# Patient Record
Sex: MALE | Race: WHITE | Employment: OTHER | URBAN - METROPOLITAN AREA
[De-identification: names, ages, dates, MRNs, and addresses within clinical notes are randomized per-mention and may not be internally consistent; named-entity substitution may affect disease eponyms.]

---

## 2018-10-17 PROBLEM — C78.7 SECONDARY MALIGNANT NEOPLASM OF LIVER AND INTRAHEPATIC BILE DUCT (HCC): Status: ACTIVE | Noted: 2018-10-17

## 2018-10-17 PROBLEM — G62.0 POLYNEUROPATHY DUE TO DRUG (HCC): Status: ACTIVE | Noted: 2018-10-17

## 2018-10-17 PROBLEM — C15.5 MALIGNANT NEOPLASM OF LOWER THIRD OF ESOPHAGUS (HCC): Status: ACTIVE | Noted: 2018-10-17

## 2018-10-17 PROBLEM — I82.621 ACUTE EMBOLISM AND THROMBOSIS OF DEEP VEIN OF RIGHT UPPER EXTREMITY (HCC): Status: ACTIVE | Noted: 2018-10-17

## 2018-10-17 PROBLEM — C78.01 SECONDARY MALIGNANT NEOPLASM OF RIGHT LUNG (HCC): Status: ACTIVE | Noted: 2018-10-17

## 2018-10-28 PROBLEM — I87.8 POOR VENOUS ACCESS: Status: ACTIVE | Noted: 2018-10-28

## 2018-11-05 DIAGNOSIS — C78.7 SECONDARY MALIGNANT NEOPLASM OF LIVER AND INTRAHEPATIC BILE DUCT (HCC): ICD-10-CM

## 2018-11-05 DIAGNOSIS — C15.5 MALIGNANT NEOPLASM OF LOWER THIRD OF ESOPHAGUS (HCC): ICD-10-CM

## 2018-11-05 DIAGNOSIS — C78.01 SECONDARY MALIGNANT NEOPLASM OF RIGHT LUNG (HCC): ICD-10-CM

## 2018-11-05 LAB
ALBUMIN SERPL-MCNC: 3.9 G/DL (ref 3.9–4.9)
ALP BLD-CCNC: 140 U/L (ref 35–104)
ALT SERPL-CCNC: 12 U/L (ref 0–41)
ANION GAP SERPL CALCULATED.3IONS-SCNC: 11 MEQ/L (ref 7–13)
AST SERPL-CCNC: 19 U/L (ref 0–40)
BILIRUB SERPL-MCNC: 0.4 MG/DL (ref 0–1.2)
BUN BLDV-MCNC: 15 MG/DL (ref 8–23)
CALCIUM SERPL-MCNC: 9.5 MG/DL (ref 8.6–10.2)
CHLORIDE BLD-SCNC: 102 MEQ/L (ref 98–107)
CO2: 24 MEQ/L (ref 22–29)
CREAT SERPL-MCNC: 0.76 MG/DL (ref 0.7–1.2)
GFR AFRICAN AMERICAN: >60
GFR NON-AFRICAN AMERICAN: >60
GLOBULIN: 2.8 G/DL (ref 2.3–3.5)
GLUCOSE BLD-MCNC: 246 MG/DL (ref 74–109)
POTASSIUM SERPL-SCNC: 4.5 MEQ/L (ref 3.5–5.1)
SODIUM BLD-SCNC: 137 MEQ/L (ref 132–144)
TOTAL PROTEIN: 6.7 G/DL (ref 6.4–8.1)

## 2018-11-19 DIAGNOSIS — C15.5 MALIGNANT NEOPLASM OF LOWER THIRD OF ESOPHAGUS (HCC): ICD-10-CM

## 2018-11-19 PROBLEM — T45.1X5A LEUKOPENIA DUE TO ANTINEOPLASTIC CHEMOTHERAPY (HCC): Status: ACTIVE | Noted: 2018-11-19

## 2018-11-19 PROBLEM — D70.1 LEUKOPENIA DUE TO ANTINEOPLASTIC CHEMOTHERAPY (HCC): Status: ACTIVE | Noted: 2018-11-19

## 2018-11-19 LAB
ALBUMIN SERPL-MCNC: 3.8 G/DL (ref 3.9–4.9)
ALP BLD-CCNC: 153 U/L (ref 35–104)
ALT SERPL-CCNC: 11 U/L (ref 0–41)
ANION GAP SERPL CALCULATED.3IONS-SCNC: 13 MEQ/L (ref 7–13)
AST SERPL-CCNC: 18 U/L (ref 0–40)
BILIRUB SERPL-MCNC: 0.5 MG/DL (ref 0–1.2)
BUN BLDV-MCNC: 13 MG/DL (ref 8–23)
CALCIUM SERPL-MCNC: 9.9 MG/DL (ref 8.6–10.2)
CHLORIDE BLD-SCNC: 103 MEQ/L (ref 98–107)
CO2: 25 MEQ/L (ref 22–29)
CREAT SERPL-MCNC: 0.75 MG/DL (ref 0.7–1.2)
GFR AFRICAN AMERICAN: >60
GFR NON-AFRICAN AMERICAN: >60
GLOBULIN: 3.1 G/DL (ref 2.3–3.5)
GLUCOSE BLD-MCNC: 200 MG/DL (ref 74–109)
POTASSIUM SERPL-SCNC: 4.2 MEQ/L (ref 3.5–5.1)
SODIUM BLD-SCNC: 141 MEQ/L (ref 132–144)
TOTAL PROTEIN: 6.9 G/DL (ref 6.4–8.1)

## 2018-12-03 DIAGNOSIS — T45.1X5A ANEMIA DUE TO ANTINEOPLASTIC CHEMOTHERAPY: ICD-10-CM

## 2018-12-03 DIAGNOSIS — I87.8 POOR VENOUS ACCESS: ICD-10-CM

## 2018-12-03 DIAGNOSIS — D64.81 ANEMIA DUE TO ANTINEOPLASTIC CHEMOTHERAPY: ICD-10-CM

## 2018-12-03 DIAGNOSIS — C15.5 MALIGNANT NEOPLASM OF LOWER THIRD OF ESOPHAGUS (HCC): ICD-10-CM

## 2018-12-03 LAB
ALBUMIN SERPL-MCNC: 3.9 G/DL (ref 3.9–4.9)
ALP BLD-CCNC: 169 U/L (ref 35–104)
ALT SERPL-CCNC: 11 U/L (ref 0–41)
ANION GAP SERPL CALCULATED.3IONS-SCNC: 15 MEQ/L (ref 7–13)
AST SERPL-CCNC: 21 U/L (ref 0–40)
BILIRUB SERPL-MCNC: 0.4 MG/DL (ref 0–1.2)
BUN BLDV-MCNC: 7 MG/DL (ref 8–23)
CALCIUM SERPL-MCNC: 9.5 MG/DL (ref 8.6–10.2)
CHLORIDE BLD-SCNC: 101 MEQ/L (ref 98–107)
CO2: 24 MEQ/L (ref 22–29)
CREAT SERPL-MCNC: 0.7 MG/DL (ref 0.7–1.2)
FERRITIN: 306.4 NG/ML (ref 30–400)
GFR AFRICAN AMERICAN: >60
GFR NON-AFRICAN AMERICAN: >60
GLOBULIN: 2.8 G/DL (ref 2.3–3.5)
GLUCOSE BLD-MCNC: 185 MG/DL (ref 74–109)
IRON SATURATION: 25 % (ref 11–46)
IRON: 65 UG/DL (ref 59–158)
POTASSIUM SERPL-SCNC: 4.2 MEQ/L (ref 3.5–5.1)
SODIUM BLD-SCNC: 140 MEQ/L (ref 132–144)
TOTAL IRON BINDING CAPACITY: 261 UG/DL (ref 178–450)
TOTAL PROTEIN: 6.7 G/DL (ref 6.4–8.1)

## 2019-01-21 DIAGNOSIS — C15.5 MALIGNANT NEOPLASM OF LOWER THIRD OF ESOPHAGUS (HCC): ICD-10-CM

## 2019-01-21 LAB
ALBUMIN SERPL-MCNC: 4.2 G/DL (ref 3.9–4.9)
ALP BLD-CCNC: 83 U/L (ref 35–104)
ALT SERPL-CCNC: 14 U/L (ref 0–41)
ANION GAP SERPL CALCULATED.3IONS-SCNC: 13 MEQ/L (ref 7–13)
AST SERPL-CCNC: 19 U/L (ref 0–40)
BILIRUB SERPL-MCNC: 0.3 MG/DL (ref 0–1.2)
BUN BLDV-MCNC: 10 MG/DL (ref 8–23)
CALCIUM SERPL-MCNC: 9.7 MG/DL (ref 8.6–10.2)
CHLORIDE BLD-SCNC: 99 MEQ/L (ref 98–107)
CO2: 25 MEQ/L (ref 22–29)
CREAT SERPL-MCNC: 0.78 MG/DL (ref 0.7–1.2)
GFR AFRICAN AMERICAN: >60
GFR NON-AFRICAN AMERICAN: >60
GLOBULIN: 2.6 G/DL (ref 2.3–3.5)
GLUCOSE BLD-MCNC: 171 MG/DL (ref 74–109)
POTASSIUM SERPL-SCNC: 4.2 MEQ/L (ref 3.5–5.1)
SODIUM BLD-SCNC: 137 MEQ/L (ref 132–144)
TOTAL PROTEIN: 6.8 G/DL (ref 6.4–8.1)

## 2019-02-04 DIAGNOSIS — C78.7 SECONDARY MALIGNANT NEOPLASM OF LIVER AND INTRAHEPATIC BILE DUCT (HCC): ICD-10-CM

## 2019-02-04 DIAGNOSIS — C15.5 MALIGNANT NEOPLASM OF LOWER THIRD OF ESOPHAGUS (HCC): ICD-10-CM

## 2019-02-04 DIAGNOSIS — T45.1X5A ANEMIA DUE TO ANTINEOPLASTIC CHEMOTHERAPY: ICD-10-CM

## 2019-02-04 DIAGNOSIS — G62.0 POLYNEUROPATHY DUE TO DRUG (HCC): ICD-10-CM

## 2019-02-04 DIAGNOSIS — I87.8 POOR VENOUS ACCESS: ICD-10-CM

## 2019-02-04 DIAGNOSIS — D64.81 ANEMIA DUE TO ANTINEOPLASTIC CHEMOTHERAPY: ICD-10-CM

## 2019-02-04 LAB
ALBUMIN SERPL-MCNC: 4.2 G/DL (ref 3.9–4.9)
ALP BLD-CCNC: 78 U/L (ref 35–104)
ALT SERPL-CCNC: 13 U/L (ref 0–41)
ANION GAP SERPL CALCULATED.3IONS-SCNC: 12 MEQ/L (ref 7–13)
AST SERPL-CCNC: 17 U/L (ref 0–40)
BILIRUB SERPL-MCNC: 0.3 MG/DL (ref 0–1.2)
BUN BLDV-MCNC: 13 MG/DL (ref 8–23)
CALCIUM SERPL-MCNC: 9.8 MG/DL (ref 8.6–10.2)
CHLORIDE BLD-SCNC: 102 MEQ/L (ref 98–107)
CO2: 25 MEQ/L (ref 22–29)
CREAT SERPL-MCNC: 0.79 MG/DL (ref 0.7–1.2)
GFR AFRICAN AMERICAN: >60
GFR NON-AFRICAN AMERICAN: >60
GLOBULIN: 2.7 G/DL (ref 2.3–3.5)
GLUCOSE BLD-MCNC: 173 MG/DL (ref 74–109)
POTASSIUM SERPL-SCNC: 4.5 MEQ/L (ref 3.5–5.1)
SODIUM BLD-SCNC: 139 MEQ/L (ref 132–144)
TOTAL PROTEIN: 6.9 G/DL (ref 6.4–8.1)

## 2019-03-11 DIAGNOSIS — C15.5 MALIGNANT NEOPLASM OF LOWER THIRD OF ESOPHAGUS (HCC): ICD-10-CM

## 2019-03-11 DIAGNOSIS — T45.1X5A LEUKOPENIA DUE TO ANTINEOPLASTIC CHEMOTHERAPY (HCC): ICD-10-CM

## 2019-03-11 DIAGNOSIS — D70.1 LEUKOPENIA DUE TO ANTINEOPLASTIC CHEMOTHERAPY (HCC): ICD-10-CM

## 2019-03-11 LAB
ALBUMIN SERPL-MCNC: 4.2 G/DL (ref 3.5–4.6)
ALP BLD-CCNC: 77 U/L (ref 35–104)
ALT SERPL-CCNC: 14 U/L (ref 0–41)
ANION GAP SERPL CALCULATED.3IONS-SCNC: 10 MEQ/L (ref 9–15)
AST SERPL-CCNC: 20 U/L (ref 0–40)
BILIRUB SERPL-MCNC: 0.3 MG/DL (ref 0.2–0.7)
BUN BLDV-MCNC: 16 MG/DL (ref 8–23)
CALCIUM SERPL-MCNC: 9.6 MG/DL (ref 8.5–9.9)
CHLORIDE BLD-SCNC: 101 MEQ/L (ref 95–107)
CO2: 25 MEQ/L (ref 20–31)
CREAT SERPL-MCNC: 0.76 MG/DL (ref 0.7–1.2)
GFR AFRICAN AMERICAN: >60
GFR NON-AFRICAN AMERICAN: >60
GLOBULIN: 2.6 G/DL (ref 2.3–3.5)
GLUCOSE BLD-MCNC: 169 MG/DL (ref 70–99)
POTASSIUM SERPL-SCNC: 4.2 MEQ/L (ref 3.4–4.9)
SODIUM BLD-SCNC: 136 MEQ/L (ref 135–144)
TOTAL PROTEIN: 6.8 G/DL (ref 6.3–8)

## 2019-03-25 DIAGNOSIS — I87.8 POOR VENOUS ACCESS: ICD-10-CM

## 2019-03-25 DIAGNOSIS — C15.5 MALIGNANT NEOPLASM OF LOWER THIRD OF ESOPHAGUS (HCC): ICD-10-CM

## 2019-03-25 LAB
ALBUMIN SERPL-MCNC: 4.1 G/DL (ref 3.5–4.6)
ALP BLD-CCNC: 72 U/L (ref 35–104)
ALT SERPL-CCNC: 20 U/L (ref 0–41)
ANION GAP SERPL CALCULATED.3IONS-SCNC: 14 MEQ/L (ref 9–15)
AST SERPL-CCNC: 22 U/L (ref 0–40)
BILIRUB SERPL-MCNC: 0.3 MG/DL (ref 0.2–0.7)
BUN BLDV-MCNC: 14 MG/DL (ref 8–23)
CALCIUM SERPL-MCNC: 9.4 MG/DL (ref 8.5–9.9)
CHLORIDE BLD-SCNC: 101 MEQ/L (ref 95–107)
CO2: 24 MEQ/L (ref 20–31)
CREAT SERPL-MCNC: 0.8 MG/DL (ref 0.7–1.2)
GFR AFRICAN AMERICAN: >60
GFR NON-AFRICAN AMERICAN: >60
GLOBULIN: 2.6 G/DL (ref 2.3–3.5)
GLUCOSE BLD-MCNC: 201 MG/DL (ref 70–99)
POTASSIUM SERPL-SCNC: 4.6 MEQ/L (ref 3.4–4.9)
SODIUM BLD-SCNC: 139 MEQ/L (ref 135–144)
TOTAL PROTEIN: 6.7 G/DL (ref 6.3–8)

## 2019-04-22 DIAGNOSIS — D70.1 LEUKOPENIA DUE TO ANTINEOPLASTIC CHEMOTHERAPY (HCC): ICD-10-CM

## 2019-04-22 DIAGNOSIS — T45.1X5A LEUKOPENIA DUE TO ANTINEOPLASTIC CHEMOTHERAPY (HCC): ICD-10-CM

## 2019-04-22 DIAGNOSIS — C15.5 MALIGNANT NEOPLASM OF LOWER THIRD OF ESOPHAGUS (HCC): ICD-10-CM

## 2019-04-22 LAB
ALBUMIN SERPL-MCNC: 4 G/DL (ref 3.5–4.6)
ALP BLD-CCNC: 70 U/L (ref 35–104)
ALT SERPL-CCNC: 15 U/L (ref 0–41)
ANION GAP SERPL CALCULATED.3IONS-SCNC: 16 MEQ/L (ref 9–15)
AST SERPL-CCNC: 19 U/L (ref 0–40)
BILIRUB SERPL-MCNC: 0.5 MG/DL (ref 0.2–0.7)
BUN BLDV-MCNC: 10 MG/DL (ref 8–23)
CALCIUM SERPL-MCNC: 9.4 MG/DL (ref 8.5–9.9)
CHLORIDE BLD-SCNC: 100 MEQ/L (ref 95–107)
CO2: 23 MEQ/L (ref 20–31)
CREAT SERPL-MCNC: 0.8 MG/DL (ref 0.7–1.2)
GFR AFRICAN AMERICAN: >60
GFR NON-AFRICAN AMERICAN: >60
GLOBULIN: 2.8 G/DL (ref 2.3–3.5)
GLUCOSE BLD-MCNC: 199 MG/DL (ref 70–99)
POTASSIUM SERPL-SCNC: 4.5 MEQ/L (ref 3.4–4.9)
SODIUM BLD-SCNC: 139 MEQ/L (ref 135–144)
TOTAL PROTEIN: 6.8 G/DL (ref 6.3–8)

## 2019-05-06 DIAGNOSIS — C15.5 MALIGNANT NEOPLASM OF LOWER THIRD OF ESOPHAGUS (HCC): ICD-10-CM

## 2019-05-06 DIAGNOSIS — I87.8 POOR VENOUS ACCESS: ICD-10-CM

## 2019-05-06 LAB
ALBUMIN SERPL-MCNC: 4 G/DL (ref 3.5–4.6)
ALP BLD-CCNC: 63 U/L (ref 35–104)
ALT SERPL-CCNC: 14 U/L (ref 0–41)
ANION GAP SERPL CALCULATED.3IONS-SCNC: 16 MEQ/L (ref 9–15)
AST SERPL-CCNC: 21 U/L (ref 0–40)
BILIRUB SERPL-MCNC: 0.5 MG/DL (ref 0.2–0.7)
BUN BLDV-MCNC: 16 MG/DL (ref 8–23)
CALCIUM SERPL-MCNC: 9.3 MG/DL (ref 8.5–9.9)
CHLORIDE BLD-SCNC: 100 MEQ/L (ref 95–107)
CO2: 23 MEQ/L (ref 20–31)
CREAT SERPL-MCNC: 0.83 MG/DL (ref 0.7–1.2)
GFR AFRICAN AMERICAN: >60
GFR NON-AFRICAN AMERICAN: >60
GLOBULIN: 2.7 G/DL (ref 2.3–3.5)
GLUCOSE BLD-MCNC: 137 MG/DL (ref 70–99)
POTASSIUM SERPL-SCNC: 4.3 MEQ/L (ref 3.4–4.9)
SODIUM BLD-SCNC: 139 MEQ/L (ref 135–144)
TOTAL PROTEIN: 6.7 G/DL (ref 6.3–8)

## 2019-05-20 PROBLEM — D61.810 PANCYTOPENIA DUE TO ANTINEOPLASTIC CHEMOTHERAPY (HCC): Status: ACTIVE | Noted: 2019-05-20

## 2019-05-20 PROBLEM — T45.1X5A PANCYTOPENIA DUE TO ANTINEOPLASTIC CHEMOTHERAPY (HCC): Status: ACTIVE | Noted: 2019-05-20

## 2019-07-02 PROBLEM — C79.31 BRAIN METASTASIS (HCC): Status: ACTIVE | Noted: 2019-07-02

## 2019-07-02 PROBLEM — G93.6 CEREBRAL EDEMA (HCC): Status: ACTIVE | Noted: 2019-07-02

## 2019-07-22 ENCOUNTER — HOSPITAL ENCOUNTER (OUTPATIENT)
Dept: RADIATION ONCOLOGY | Age: 74
Discharge: HOME | End: 2019-07-22
Payer: MEDICARE

## 2019-07-22 VITALS
RESPIRATION RATE: 16 BRPM | OXYGEN SATURATION: 98 % | DIASTOLIC BLOOD PRESSURE: 79 MMHG | HEART RATE: 60 BPM | WEIGHT: 162 LBS | SYSTOLIC BLOOD PRESSURE: 138 MMHG | BODY MASS INDEX: 24.63 KG/M2 | TEMPERATURE: 97.4 F

## 2019-07-22 DIAGNOSIS — C78.7 SECONDARY MALIGNANT NEOPLASM OF LIVER AND INTRAHEPATIC BILE DUCT (HCC): ICD-10-CM

## 2019-07-22 DIAGNOSIS — C15.5 MALIGNANT NEOPLASM OF LOWER THIRD OF ESOPHAGUS (HCC): ICD-10-CM

## 2019-07-22 DIAGNOSIS — C79.31 BRAIN METASTASIS (HCC): ICD-10-CM

## 2019-07-22 DIAGNOSIS — C78.01 SECONDARY MALIGNANT NEOPLASM OF RIGHT LUNG (HCC): Primary | ICD-10-CM

## 2019-07-22 PROCEDURE — 99212 OFFICE O/P EST SF 10 MIN: CPT | Performed by: RADIOLOGY

## 2019-07-22 RX ORDER — INSULIN GLARGINE 100 [IU]/ML
24 INJECTION, SOLUTION SUBCUTANEOUS EVERY MORNING
COMMUNITY

## 2019-08-09 ENCOUNTER — HOSPITAL ENCOUNTER (OUTPATIENT)
Dept: RADIATION ONCOLOGY | Age: 74
Discharge: HOME | End: 2019-08-09
Payer: MEDICARE

## 2019-08-09 VITALS
DIASTOLIC BLOOD PRESSURE: 63 MMHG | HEART RATE: 71 BPM | RESPIRATION RATE: 16 BRPM | SYSTOLIC BLOOD PRESSURE: 105 MMHG | TEMPERATURE: 97.3 F | WEIGHT: 155.8 LBS | BODY MASS INDEX: 23.69 KG/M2 | OXYGEN SATURATION: 98 %

## 2019-08-09 DIAGNOSIS — C15.5 MALIGNANT NEOPLASM OF LOWER THIRD OF ESOPHAGUS (HCC): ICD-10-CM

## 2019-08-09 DIAGNOSIS — C78.01 SECONDARY MALIGNANT NEOPLASM OF RIGHT LUNG (HCC): Primary | ICD-10-CM

## 2019-08-09 DIAGNOSIS — C79.31 BRAIN METASTASIS (HCC): ICD-10-CM

## 2019-08-09 PROCEDURE — 77334 RADIATION TREATMENT AID(S): CPT | Performed by: RADIOLOGY

## 2019-08-09 PROCEDURE — 99214 OFFICE O/P EST MOD 30 MIN: CPT | Performed by: RADIOLOGY

## 2019-08-09 PROCEDURE — 77290 THER RAD SIMULAJ FIELD CPLX: CPT | Performed by: RADIOLOGY

## 2019-08-09 NOTE — ONCOLOGY
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 Last chemotherapy dose was on 7/1/19. Systemic therapy was interrupted by the finding of CNS metastases, with a 4.2 cm right parieto-occipital mass with some extension into the temporal lobe and surrounding edema, and there is a second roughly 0.5 cm lesion involving the left parietal lobe. PRIOR TREATMENT: Craniotomy at [facility] with Dr. Teresa Covarrubias on 7/23/2019, gross total resection based on postop imaging, pathology reported adenocarcinoma consistent with GI origin. TIME SINCE TREATMENT: 2.5 weeks and surgery    INTERVAL HISTORY: The patient had presented with a left visual field deficit. This has improved since surgery. No other neurological deficits. No headaches, nausea vomiting, or visual symptoms. Patient is ambulatory without difficulty. Our plan is to proceed with SBRT to the surgical bed, and Gamma Knife the second lesion on 8/26/2019. The patient came in today for CT simulation in preparation for SBRT.     PAST MEDICAL HISTORY:   Past Medical History:   Diagnosis

## 2019-08-15 ENCOUNTER — HOSPITAL ENCOUNTER (OUTPATIENT)
Dept: MRI IMAGING | Age: 74
Discharge: HOME | End: 2019-08-17
Payer: MEDICARE

## 2019-08-15 DIAGNOSIS — C79.31 SECONDARY MALIGNANT NEOPLASM OF BRAIN AND SPINAL CORD (HCC): ICD-10-CM

## 2019-08-15 DIAGNOSIS — C79.49 SECONDARY MALIGNANT NEOPLASM OF BRAIN AND SPINAL CORD (HCC): ICD-10-CM

## 2019-08-15 PROCEDURE — A9577 INJ MULTIHANCE: HCPCS | Performed by: RADIOLOGY

## 2019-08-15 PROCEDURE — 6360000004 HC RX CONTRAST MEDICATION: Performed by: RADIOLOGY

## 2019-08-15 PROCEDURE — 70553 MRI BRAIN STEM W/O & W/DYE: CPT

## 2019-08-15 RX ORDER — SODIUM CHLORIDE 0.9 % (FLUSH) 0.9 %
10 SYRINGE (ML) INJECTION 2 TIMES DAILY
Status: DISCONTINUED | OUTPATIENT
Start: 2019-08-15 | End: 2019-08-18 | Stop reason: HOSPADM

## 2019-08-15 RX ADMIN — GADOBENATE DIMEGLUMINE 15 ML: 529 INJECTION, SOLUTION INTRAVENOUS at 20:41

## 2019-08-16 PROCEDURE — 77295 3-D RADIOTHERAPY PLAN: CPT | Performed by: RADIOLOGY

## 2019-08-16 PROCEDURE — 77399 UNLISTED PX MED RADJ PHYSICS: CPT | Performed by: RADIOLOGY

## 2019-08-16 PROCEDURE — 77300 RADIATION THERAPY DOSE PLAN: CPT | Performed by: RADIOLOGY

## 2019-08-16 PROCEDURE — 77334 RADIATION TREATMENT AID(S): CPT | Performed by: RADIOLOGY

## 2019-08-19 PROCEDURE — 77470 SPECIAL RADIATION TREATMENT: CPT | Performed by: RADIOLOGY

## 2019-08-19 PROCEDURE — 77370 RADIATION PHYSICS CONSULT: CPT | Performed by: RADIOLOGY

## 2019-08-21 PROCEDURE — 77280 THER RAD SIMULAJ FIELD SMPL: CPT | Performed by: RADIOLOGY

## 2019-08-21 PROCEDURE — 77373 STRTCTC BDY RAD THER TX DLVR: CPT | Performed by: RADIOLOGY

## 2019-08-21 PROCEDURE — 99211 OFF/OP EST MAY X REQ PHY/QHP: CPT | Performed by: RADIOLOGY

## 2019-08-21 RX ORDER — DEXAMETHASONE 2 MG/1
2 TABLET ORAL DAILY
COMMUNITY
End: 2019-10-10 | Stop reason: ALTCHOICE

## 2019-08-22 PROCEDURE — 99211 OFF/OP EST MAY X REQ PHY/QHP: CPT | Performed by: RADIOLOGY

## 2019-08-22 PROCEDURE — 77373 STRTCTC BDY RAD THER TX DLVR: CPT | Performed by: RADIOLOGY

## 2019-08-22 PROCEDURE — 77417 THER RADIOLOGY PORT IMAGE(S): CPT | Performed by: RADIOLOGY

## 2019-08-23 PROCEDURE — 77417 THER RADIOLOGY PORT IMAGE(S): CPT | Performed by: RADIOLOGY

## 2019-08-23 PROCEDURE — 99211 OFF/OP EST MAY X REQ PHY/QHP: CPT | Performed by: RADIOLOGY

## 2019-08-23 PROCEDURE — 77373 STRTCTC BDY RAD THER TX DLVR: CPT | Performed by: RADIOLOGY

## 2019-09-05 ENCOUNTER — HOSPITAL ENCOUNTER (OUTPATIENT)
Dept: CT IMAGING | Age: 74
Discharge: HOME | End: 2019-09-07
Payer: MEDICARE

## 2019-09-05 ENCOUNTER — HOSPITAL ENCOUNTER (OUTPATIENT)
Dept: CT IMAGING | Age: 74
End: 2019-09-05
Payer: MEDICARE

## 2019-09-05 DIAGNOSIS — C78.7 SECONDARY MALIGNANT NEOPLASM OF LIVER (HCC): ICD-10-CM

## 2019-09-05 DIAGNOSIS — C78.01 SECONDARY MALIGNANT NEOPLASM OF RIGHT LUNG (HCC): ICD-10-CM

## 2019-09-05 DIAGNOSIS — C15.5 ADENOCARCINOMA OF LOWER ESOPHAGUS (HCC): ICD-10-CM

## 2019-09-05 DIAGNOSIS — C79.31 BRAIN METASTASIS (HCC): ICD-10-CM

## 2019-09-05 DIAGNOSIS — T45.1X5A ANEMIA DUE TO CHEMOTHERAPY: ICD-10-CM

## 2019-09-05 DIAGNOSIS — D64.81 ANEMIA DUE TO CHEMOTHERAPY: ICD-10-CM

## 2019-09-05 PROCEDURE — 2580000003 HC RX 258: Performed by: INTERNAL MEDICINE

## 2019-09-05 PROCEDURE — 74177 CT ABD & PELVIS W/CONTRAST: CPT

## 2019-09-05 PROCEDURE — 2500000003 HC RX 250 WO HCPCS: Performed by: INTERNAL MEDICINE

## 2019-09-05 PROCEDURE — 71260 CT THORAX DX C+: CPT

## 2019-09-05 PROCEDURE — 6360000004 HC RX CONTRAST MEDICATION: Performed by: INTERNAL MEDICINE

## 2019-09-05 RX ORDER — SODIUM CHLORIDE 0.9 % (FLUSH) 0.9 %
10 SYRINGE (ML) INJECTION ONCE
Status: COMPLETED | OUTPATIENT
Start: 2019-09-05 | End: 2019-09-05

## 2019-09-05 RX ADMIN — IOPAMIDOL 100 ML: 612 INJECTION, SOLUTION INTRAVENOUS at 09:00

## 2019-09-05 RX ADMIN — BARIUM SULFATE 450 ML: 20 SUSPENSION ORAL at 08:59

## 2019-09-05 RX ADMIN — Medication 10 ML: at 09:00

## 2019-10-10 ENCOUNTER — HOSPITAL ENCOUNTER (OUTPATIENT)
Dept: RADIATION ONCOLOGY | Age: 74
Discharge: HOME | End: 2019-10-10
Payer: MEDICARE

## 2019-10-10 VITALS
HEART RATE: 76 BPM | SYSTOLIC BLOOD PRESSURE: 137 MMHG | TEMPERATURE: 97.5 F | RESPIRATION RATE: 16 BRPM | BODY MASS INDEX: 25.33 KG/M2 | WEIGHT: 166.6 LBS | OXYGEN SATURATION: 95 % | DIASTOLIC BLOOD PRESSURE: 77 MMHG

## 2019-10-10 DIAGNOSIS — C78.01 SECONDARY MALIGNANT NEOPLASM OF RIGHT LUNG (HCC): Primary | ICD-10-CM

## 2019-10-10 DIAGNOSIS — C78.7 SECONDARY MALIGNANT NEOPLASM OF LIVER AND INTRAHEPATIC BILE DUCT (HCC): ICD-10-CM

## 2019-10-10 DIAGNOSIS — C15.5 MALIGNANT NEOPLASM OF LOWER THIRD OF ESOPHAGUS (HCC): ICD-10-CM

## 2019-10-10 PROCEDURE — 99212 OFFICE O/P EST SF 10 MIN: CPT | Performed by: RADIOLOGY

## 2019-11-04 LAB — VITAMIN D 25-HYDROXY: 31 NG/ML

## 2019-11-07 ENCOUNTER — HOSPITAL ENCOUNTER (OUTPATIENT)
Dept: RADIATION ONCOLOGY | Age: 74
Discharge: HOME | End: 2019-11-07
Payer: MEDICARE

## 2019-11-07 ENCOUNTER — APPOINTMENT (OUTPATIENT)
Dept: RADIATION ONCOLOGY | Age: 74
End: 2019-11-07
Payer: MEDICARE

## 2019-11-07 ENCOUNTER — HOSPITAL ENCOUNTER (OUTPATIENT)
Dept: RADIATION ONCOLOGY | Age: 74
Discharge: HOME | End: 2019-11-07
Attending: RADIOLOGY
Payer: MEDICARE

## 2019-11-07 VITALS
BODY MASS INDEX: 24.14 KG/M2 | HEART RATE: 92 BPM | TEMPERATURE: 99.1 F | DIASTOLIC BLOOD PRESSURE: 93 MMHG | OXYGEN SATURATION: 98 % | WEIGHT: 158.73 LBS | SYSTOLIC BLOOD PRESSURE: 143 MMHG | RESPIRATION RATE: 16 BRPM

## 2019-11-07 DIAGNOSIS — C79.31 BRAIN METASTASIS (HCC): ICD-10-CM

## 2019-11-07 DIAGNOSIS — C15.5 MALIGNANT NEOPLASM OF LOWER THIRD OF ESOPHAGUS (HCC): Primary | ICD-10-CM

## 2019-11-07 DIAGNOSIS — C78.01 SECONDARY MALIGNANT NEOPLASM OF RIGHT LUNG (HCC): ICD-10-CM

## 2019-11-07 DIAGNOSIS — G89.3 PAIN FROM BONE METASTASES (HCC): ICD-10-CM

## 2019-11-07 DIAGNOSIS — G89.3 PAIN FROM BONE METASTASES (HCC): Primary | ICD-10-CM

## 2019-11-07 DIAGNOSIS — C78.7 SECONDARY MALIGNANT NEOPLASM OF LIVER AND INTRAHEPATIC BILE DUCT (HCC): ICD-10-CM

## 2019-11-07 DIAGNOSIS — C79.51 PAIN FROM BONE METASTASES (HCC): Primary | ICD-10-CM

## 2019-11-07 DIAGNOSIS — C79.51 PAIN FROM BONE METASTASES (HCC): ICD-10-CM

## 2019-11-07 DIAGNOSIS — C15.5 MALIGNANT NEOPLASM OF LOWER THIRD OF ESOPHAGUS (HCC): ICD-10-CM

## 2019-11-07 PROCEDURE — 99214 OFFICE O/P EST MOD 30 MIN: CPT | Performed by: RADIOLOGY

## 2019-11-07 PROCEDURE — 77334 RADIATION TREATMENT AID(S): CPT | Performed by: RADIOLOGY

## 2019-11-07 PROCEDURE — 77290 THER RAD SIMULAJ FIELD CPLX: CPT | Performed by: RADIOLOGY

## 2019-11-07 RX ORDER — LEVETIRACETAM 250 MG/1
250 TABLET ORAL 3 TIMES DAILY
COMMUNITY

## 2019-11-07 RX ORDER — OXYCODONE HYDROCHLORIDE AND ACETAMINOPHEN 5; 325 MG/1; MG/1
1 TABLET ORAL EVERY 4 HOURS PRN
Qty: 42 TABLET | Refills: 0 | Status: SHIPPED | OUTPATIENT
Start: 2019-11-07 | End: 2019-11-14

## 2019-11-08 PROCEDURE — 77300 RADIATION THERAPY DOSE PLAN: CPT | Performed by: RADIOLOGY

## 2019-11-08 PROCEDURE — 77295 3-D RADIOTHERAPY PLAN: CPT | Performed by: RADIOLOGY

## 2019-11-08 PROCEDURE — 77334 RADIATION TREATMENT AID(S): CPT | Performed by: RADIOLOGY

## 2019-11-11 PROCEDURE — 77412 RADIATION TX DELIVERY LVL 3: CPT | Performed by: RADIOLOGY

## 2019-11-11 PROCEDURE — 77280 THER RAD SIMULAJ FIELD SMPL: CPT | Performed by: RADIOLOGY

## 2019-11-12 ENCOUNTER — HOSPITAL ENCOUNTER (OUTPATIENT)
Dept: RADIATION ONCOLOGY | Age: 74
End: 2019-11-12
Attending: RADIOLOGY
Payer: MEDICARE

## 2019-11-12 PROCEDURE — 77417 THER RADIOLOGY PORT IMAGE(S): CPT | Performed by: RADIOLOGY

## 2019-11-12 PROCEDURE — 99213 OFFICE O/P EST LOW 20 MIN: CPT | Performed by: RADIOLOGY

## 2019-11-12 PROCEDURE — 77412 RADIATION TX DELIVERY LVL 3: CPT | Performed by: RADIOLOGY

## 2019-11-13 ENCOUNTER — HOSPITAL ENCOUNTER (OUTPATIENT)
Dept: RADIATION ONCOLOGY | Age: 74
Discharge: HOME | End: 2019-11-13
Attending: RADIOLOGY
Payer: MEDICARE

## 2019-11-13 PROCEDURE — 77412 RADIATION TX DELIVERY LVL 3: CPT | Performed by: RADIOLOGY

## 2019-11-13 PROCEDURE — 77417 THER RADIOLOGY PORT IMAGE(S): CPT | Performed by: RADIOLOGY

## 2019-11-14 ENCOUNTER — HOSPITAL ENCOUNTER (OUTPATIENT)
Dept: RADIATION ONCOLOGY | Age: 74
Discharge: HOME | End: 2019-11-14
Attending: RADIOLOGY
Payer: MEDICARE

## 2019-11-14 PROCEDURE — 77417 THER RADIOLOGY PORT IMAGE(S): CPT | Performed by: RADIOLOGY

## 2019-11-14 PROCEDURE — 77412 RADIATION TX DELIVERY LVL 3: CPT | Performed by: RADIOLOGY

## 2019-11-15 ENCOUNTER — HOSPITAL ENCOUNTER (OUTPATIENT)
Dept: RADIATION ONCOLOGY | Age: 74
Discharge: HOME | End: 2019-11-15
Attending: RADIOLOGY
Payer: MEDICARE

## 2019-11-15 PROCEDURE — 77417 THER RADIOLOGY PORT IMAGE(S): CPT | Performed by: RADIOLOGY

## 2019-11-15 PROCEDURE — 77336 RADIATION PHYSICS CONSULT: CPT | Performed by: RADIOLOGY

## 2019-11-15 PROCEDURE — 77412 RADIATION TX DELIVERY LVL 3: CPT | Performed by: RADIOLOGY

## 2019-11-18 ENCOUNTER — APPOINTMENT (OUTPATIENT)
Dept: RADIATION ONCOLOGY | Age: 74
End: 2019-11-18
Attending: RADIOLOGY
Payer: MEDICARE

## 2019-11-19 ENCOUNTER — APPOINTMENT (OUTPATIENT)
Dept: RADIATION ONCOLOGY | Age: 74
End: 2019-11-19
Attending: RADIOLOGY
Payer: MEDICARE

## 2019-11-20 ENCOUNTER — APPOINTMENT (OUTPATIENT)
Dept: RADIATION ONCOLOGY | Age: 74
End: 2019-11-20
Attending: RADIOLOGY
Payer: MEDICARE

## 2019-11-21 ENCOUNTER — APPOINTMENT (OUTPATIENT)
Dept: RADIATION ONCOLOGY | Age: 74
End: 2019-11-21
Attending: RADIOLOGY
Payer: MEDICARE

## 2019-11-21 RX ORDER — OXYCODONE HYDROCHLORIDE AND ACETAMINOPHEN 5; 325 MG/1; MG/1
1 TABLET ORAL EVERY 4 HOURS PRN
Qty: 42 TABLET | Refills: 0 | Status: SHIPPED | OUTPATIENT
Start: 2019-11-21 | End: 2019-11-28

## 2019-11-22 ENCOUNTER — APPOINTMENT (OUTPATIENT)
Dept: RADIATION ONCOLOGY | Age: 74
End: 2019-11-22
Attending: RADIOLOGY
Payer: MEDICARE

## 2019-11-25 ENCOUNTER — APPOINTMENT (OUTPATIENT)
Dept: RADIATION ONCOLOGY | Age: 74
End: 2019-11-25
Attending: RADIOLOGY
Payer: MEDICARE